# Patient Record
Sex: MALE | HISPANIC OR LATINO | Employment: UNEMPLOYED | URBAN - METROPOLITAN AREA
[De-identification: names, ages, dates, MRNs, and addresses within clinical notes are randomized per-mention and may not be internally consistent; named-entity substitution may affect disease eponyms.]

---

## 2020-01-15 ENCOUNTER — TRANSFERRED RECORDS (OUTPATIENT)
Dept: HEALTH INFORMATION MANAGEMENT | Facility: CLINIC | Age: 20
End: 2020-01-15

## 2020-01-15 LAB — EJECTION FRACTION: 56 %

## 2020-09-22 ENCOUNTER — TRANSFERRED RECORDS (OUTPATIENT)
Dept: HEALTH INFORMATION MANAGEMENT | Facility: CLINIC | Age: 20
End: 2020-09-22

## 2022-09-26 ENCOUNTER — TRANSFERRED RECORDS (OUTPATIENT)
Dept: HEALTH INFORMATION MANAGEMENT | Facility: CLINIC | Age: 22
End: 2022-09-26

## 2022-09-26 LAB — EJECTION FRACTION: 77 %

## 2022-09-29 ENCOUNTER — TRANSCRIBE ORDERS (OUTPATIENT)
Dept: OTHER | Age: 22
End: 2022-09-29

## 2022-09-29 DIAGNOSIS — Q24.4 SUBAORTIC VALVE STENOSIS, CONGENITAL: Primary | ICD-10-CM

## 2022-09-29 DIAGNOSIS — Q23.1 CONGENITAL INSUFFICIENCY OF AORTIC VALVE: ICD-10-CM

## 2022-11-30 NOTE — TELEPHONE ENCOUNTER
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  Primary closure   of small atrial septal defect.  Both done on cardiopulmonary bypass.     LABS   Internal    SCANS     EKG/MONITORS   Care Everywhere 1.6.21 ekg, [facility]   IMAGES      CT/CTA (head, neck, chest, abdomen, pelvis) PACS 3.24.18 CT ABDOMEN PELVIS, [facility]   XRAY (chest) PACS 1.6.21 [facility]     Action 11.30.22 10:41 AM CHARAN   Action Taken Faxed request to [facility] for records [phone]. Faxed request to [facility] [phone], [phone]  for images      Action 12.27.22 8:11 AM CHARAN    Action Taken Faxed another request to [facility]

## 2023-01-10 ENCOUNTER — LAB (OUTPATIENT)
Dept: LAB | Facility: CLINIC | Age: 23
End: 2023-01-10
Payer: COMMERCIAL

## 2023-01-10 ENCOUNTER — PRE VISIT (OUTPATIENT)
Dept: CARDIOLOGY | Facility: CLINIC | Age: 23
End: 2023-01-10

## 2023-01-10 ENCOUNTER — ANCILLARY PROCEDURE (OUTPATIENT)
Dept: CARDIOLOGY | Facility: CLINIC | Age: 23
End: 2023-01-10
Payer: COMMERCIAL

## 2023-01-10 ENCOUNTER — OFFICE VISIT (OUTPATIENT)
Dept: CARDIOLOGY | Facility: CLINIC | Age: 23
End: 2023-01-10
Payer: COMMERCIAL

## 2023-01-10 VITALS
OXYGEN SATURATION: 98 % | BODY MASS INDEX: 27.01 KG/M2 | SYSTOLIC BLOOD PRESSURE: 124 MMHG | HEIGHT: 64 IN | WEIGHT: 158.2 LBS | DIASTOLIC BLOOD PRESSURE: 82 MMHG | HEART RATE: 69 BPM

## 2023-01-10 DIAGNOSIS — Q24.4 SUBAORTIC VALVE STENOSIS, CONGENITAL: ICD-10-CM

## 2023-01-10 LAB
ALBUMIN SERPL BCG-MCNC: 4.6 G/DL (ref 3.5–5.2)
ALP SERPL-CCNC: 90 U/L (ref 40–129)
ALT SERPL W P-5'-P-CCNC: 20 U/L (ref 10–50)
ANION GAP SERPL CALCULATED.3IONS-SCNC: 8 MMOL/L (ref 7–15)
AST SERPL W P-5'-P-CCNC: 22 U/L (ref 10–50)
BASOPHILS # BLD AUTO: 0.1 10E3/UL (ref 0–0.2)
BASOPHILS NFR BLD AUTO: 1 %
BILIRUB SERPL-MCNC: 0.3 MG/DL
BUN SERPL-MCNC: 8.4 MG/DL (ref 6–20)
CALCIUM SERPL-MCNC: 9.8 MG/DL (ref 8.6–10)
CHLORIDE SERPL-SCNC: 101 MMOL/L (ref 98–107)
CHOLEST SERPL-MCNC: 127 MG/DL
CREAT SERPL-MCNC: 0.93 MG/DL (ref 0.67–1.17)
DEPRECATED HCO3 PLAS-SCNC: 30 MMOL/L (ref 22–29)
EOSINOPHIL # BLD AUTO: 0.1 10E3/UL (ref 0–0.7)
EOSINOPHIL NFR BLD AUTO: 2 %
ERYTHROCYTE [DISTWIDTH] IN BLOOD BY AUTOMATED COUNT: 12.4 % (ref 10–15)
GFR SERPL CREATININE-BSD FRML MDRD: >90 ML/MIN/1.73M2
GLUCOSE SERPL-MCNC: 103 MG/DL (ref 70–99)
HCT VFR BLD AUTO: 44.6 % (ref 40–53)
HDLC SERPL-MCNC: 33 MG/DL
HGB BLD-MCNC: 14.4 G/DL (ref 13.3–17.7)
IMM GRANULOCYTES # BLD: 0 10E3/UL
IMM GRANULOCYTES NFR BLD: 0 %
LDLC SERPL CALC-MCNC: 58 MG/DL
LYMPHOCYTES # BLD AUTO: 2.7 10E3/UL (ref 0.8–5.3)
LYMPHOCYTES NFR BLD AUTO: 32 %
MCH RBC QN AUTO: 25.6 PG (ref 26.5–33)
MCHC RBC AUTO-ENTMCNC: 32.3 G/DL (ref 31.5–36.5)
MCV RBC AUTO: 79 FL (ref 78–100)
MONOCYTES # BLD AUTO: 0.8 10E3/UL (ref 0–1.3)
MONOCYTES NFR BLD AUTO: 9 %
NEUTROPHILS # BLD AUTO: 5 10E3/UL (ref 1.6–8.3)
NEUTROPHILS NFR BLD AUTO: 56 %
NONHDLC SERPL-MCNC: 94 MG/DL
NRBC # BLD AUTO: 0 10E3/UL
NRBC BLD AUTO-RTO: 0 /100
PLATELET # BLD AUTO: 341 10E3/UL (ref 150–450)
POTASSIUM SERPL-SCNC: 3.6 MMOL/L (ref 3.4–5.3)
PROT SERPL-MCNC: 7.6 G/DL (ref 6.4–8.3)
RBC # BLD AUTO: 5.63 10E6/UL (ref 4.4–5.9)
SODIUM SERPL-SCNC: 139 MMOL/L (ref 136–145)
TRIGL SERPL-MCNC: 181 MG/DL
TSH SERPL DL<=0.005 MIU/L-ACNC: 1.36 UIU/ML (ref 0.3–4.2)
WBC # BLD AUTO: 8.7 10E3/UL (ref 4–11)

## 2023-01-10 PROCEDURE — 93325 DOPPLER ECHO COLOR FLOW MAPG: CPT | Performed by: PEDIATRICS

## 2023-01-10 PROCEDURE — 80050 GENERAL HEALTH PANEL: CPT | Performed by: PATHOLOGY

## 2023-01-10 PROCEDURE — 93005 ELECTROCARDIOGRAM TRACING: CPT

## 2023-01-10 PROCEDURE — 93320 DOPPLER ECHO COMPLETE: CPT | Performed by: PEDIATRICS

## 2023-01-10 PROCEDURE — 36415 COLL VENOUS BLD VENIPUNCTURE: CPT | Performed by: PATHOLOGY

## 2023-01-10 PROCEDURE — G0463 HOSPITAL OUTPT CLINIC VISIT: HCPCS | Mod: 25

## 2023-01-10 PROCEDURE — 80061 LIPID PANEL: CPT | Performed by: PATHOLOGY

## 2023-01-10 PROCEDURE — 93303 ECHO TRANSTHORACIC: CPT | Performed by: PEDIATRICS

## 2023-01-10 PROCEDURE — 99204 OFFICE O/P NEW MOD 45 MIN: CPT | Mod: 25

## 2023-01-10 RX ORDER — ALBUTEROL SULFATE 90 UG/1
AEROSOL, METERED RESPIRATORY (INHALATION)
COMMUNITY
Start: 2022-11-30 | End: 2023-01-10

## 2023-01-10 ASSESSMENT — PAIN SCALES - GENERAL: PAINLEVEL: MILD PAIN (3)

## 2023-01-10 NOTE — PROGRESS NOTES
Adult Congenital Cardiology New Patient Visit    Patient:  Diogenes Streeter MRN:  [id number]   YOB: 2000 Age:  22 year old   Date of Visit:  Andrey 10, 2023 PCP:  Christianne Mosher MD     Dear Dr. Mosher,      I had the pleasure of meeting your patient Diogenes Streeter at the [facility] on Andrey 10, 2023.  Diogenes is a 21 yo male with history of subaortic membrane resection in 2006 at [facility]. He has had intermittent follow up at [facility] and [facility], most recently in September 2022. Diogenes is here today to establish care in our [facility] clinic.     Diogenes reports that he4 bothered by intermittent shortness of breath and chest tightness, more often in the winter. He more rarely has sharp chest pains. He does not feel they respond to his inhalers. It occurs about 3 times per week and lasts 30-60 minutes at a time. Can be improved with positional changes and vapor rub. He is active  At work with lifting and walking. Denies palpitations, edema, orthopnea. Does not do much regular exercise except for work.     Diogenes does not smoke or vape. He does have mild asthma. No regular medications.     Past medical history:   Cardiac: Subaortic stenosis s/p resection [facility] 2006 at 5 years of age (Dr. Alvarez)  Chronic chest pain > 6 months, atypical (per chart notes has had chronic chest pain for > 10 years)    Other PMH: Sleep apnea s/p Tonsillectomy at age 3 (Dr Townsend 2003)  Appendectomy 2015  Prepatellar nodule L knee, post traumatic  Mild asthma  Anxiety       Current Outpatient Medications   Medication Sig Dispense Refill     FLUoxetine (PROZAC) 20 MG capsule Take 1 capsule by mouth daily       Multiple Vitamins-Minerals (MULTIVITAMIN PO) Take 1 tablet by mouth daily         No Known Allergies      Family History:   Son age 7 years, healthy  Grandfather with heart problems by age 50  2 siblings healthy, mom healthy.  "  No FH of CHD, sudden death, myopathy, arrhythmia.         Social history:  Works, Lives with girlfriend and son, no smoking, rare EtoH  Social History     Occupational History     Not on file   Tobacco Use     Smoking status: Never     Smokeless tobacco: Never   Substance and Sexual Activity     Alcohol use: Not on file     Drug use: Not on file     Sexual activity: Not on file       Marital Status: Single      Review of Systems: A comprehensive review of systems was performed and is negative, except as noted in the HPI and PMH  Review of Systems     Physical exam:  Vitals: /82 (BP Location: Right arm, Patient Position: Chair, Cuff Size: Adult Regular)   Pulse 69   Ht 1.63 m (5' 4.17\")   Wt 71.8 kg (158 lb 3.2 oz)   SpO2 98%   BMI 27.01 kg/m    BMI= Body mass index is 27.01 kg/m . Well appearing young man. There is no central or peripheral cyanosis. Pupils are reactive and sclera are not jaundiced. There is no conjunctival injection or discharge. EOMI. Mucous membranes are moist and pink.   Lungs are clear to ausculation bilaterally with no wheezes, rales or rhonchi. There is no increased work of breathing, retractions or nasal flaring. Precordium is quiet with a normally placed apical impulse. Well healed midline sternotomy.  On auscultation, heart sounds are regular with normal S1 and physiologically split S2. Soft SM LMSB, no DM, rubs or gallops.  Abdomen is soft and non-tender without masses or hepatomegaly. Femoral pulses are normal with no brachial femoral delay.Skin is without rashes, lesions, or significant bruising. Extremities are warm and well-perfused with no cyanosis, clubbing or edema. Peripheral pulses are normal and there is < 2 sec capillary refill. Patient is alert and oriented and moves all extremities equally with normal tone.         12 Lead EKG performed today shows NSR at 67 BPM with normal intervals and no chamber enlargement or hypertrophy.     An echocardiogram performed today " shows normal ventricular function, Mild AI (Stable as described) and no significant LVOT gradient.     Recent Results (from the past 4320 hour(s))   Echo Congenital Adult    Narrative    [id number]  [id number]  [id number]  [id number]^LUKE^RHONDA^NISHANT                                                               Study ID: [id number]                                                 [facility] [address]                                                [place] [address]                                                Phone: [phone]                                Pediatric Echocardiogram  ______________________________________________________________________________  Name: PHIL MCINTOSH  Study Date: 01/10/2023 02:37 PM                  Patient Location: [facility]  MRN: [id number]                                  Age: 22 yrs  : 2000                                  BP: 134/79 mmHg  Gender: Male                                     HR: 72  Patient Class: Outpatient                        Height: 157 cm  Ordering Provider: RHONDA BUTLER             Weight: 72 kg  Referring Provider: RHONDA BUTLER            BSA: 1.7 m2  Performed By: Milly Gudino  Report approved by: Daniel Kebede MD  Reason For Study: Subaortic valve stenosis, congenital  ______________________________________________________________________________  ##### CONCLUSIONS #####  Subaortic membrane status post resection ().     The subcostal views were difficult to obtain and are suboptimal in quality.  There is unobstructed flow through the left ventricular outflow tract, peak  gradient 11 mmHg. Mild aortic valve insufficiency, no aortic valve stenosis.  The left and right ventricles have normal chamber size, wall thickness, and  systolic  function.  ______________________________________________________________________________  Technical information:  A complete two dimensional, MMODE, spectral and color Doppler transthoracic  echocardiogram is performed. The study quality is fair. Images are obtained  from parasternal, apical, subcostal and suprasternal notch views. The  subcostal views were difficult to obtain and are suboptimal in quality. Prior  echocardiogram available for comparison. ECG tracing shows regular rhythm.     Segmental Anatomy:  There is normal atrial arrangement, with concordant atrioventricular and  ventriculoarterial connections.     Systemic and pulmonary veins:  The inferior vena cava drains normally to the right atrium. Color flow  demonstrates flow from at least one pulmonary vein entering the left atrium.     Atria and atrial septum:  Normal right atrial size. The left atrium is normal in size. The atrial septum  is not well visualized.     Atrioventricular valves:  The tricuspid valve is normal in appearance and motion. Trivial tricuspid  valve insufficiency. Estimated right ventricular systolic pressure is 27 mmHg  plus right atrial pressure. The mitral valve is normal in appearance and  motion. There is no mitral valve insufficiency.     Ventricles and Ventricular Septum:  The left and right ventricles have normal chamber size, wall thickness, and  systolic function. There is no ventricular level shunting.     Outflow tracts:  Normal great artery relationship. There is unobstructed flow through the right  ventricular outflow tract. The pulmonary valve motion is normal. There is  normal flow across the pulmonary valve. Trivial pulmonary valve insufficiency.  Post surgical resection of a subaortic membrane/ridge. There is unobstructed  flow through the left ventricular outflow tract. Tricuspid aortic valve with  normal appearance and motion. There is normal flow across the aortic valve.  Mild (1+) aortic valve  insufficiency.     Great arteries:  The main pulmonary artery has normal appearance. There is unobstructed flow in  the main pulmonary artery. The pulmonary artery bifurcation is normal. There  is unobstructed flow in both branch pulmonary arteries. Normal ascending  aorta. The aortic arch appears normal. There is unobstructed antegrade flow in  the ascending, transverse arch, descending thoracic and abdominal aorta.     Arterial Shunts:  The ductal region is not imaged with this study.     Coronaries:  The coronary arteries are not evaluated.     Effusions, catheters, cannulas and leads:  No pericardial effusion.     MMode/2D Measurements & Calculations  LA dimension: 3.6 cm                Ao root diam: 2.3 cm  LA/Ao: 1.6                          LVMI(BSA): 75.2 grams/m2  LVMI(Height): 39.6                  RWT(MM): 0.36     Doppler Measurements & Calculations  MV E max cheryl: 94.9 cm/sec              Ao V2 max: 163.9 cm/sec  MV A max cheryl: 63.0 cm/sec              Ao max PG: 10.8 mmHg  MV E/A: 1.5  LV V1 max: 162.8 cm/sec                PA V2 max: 104.5 cm/sec  LV V1 max PG: 10.7 mmHg                PA max P.4 mmHg  TR max cheryl: 259.3 cm/sec               LPA max cheryl: 99.2 cm/sec  TR max P.9 mmHg                   LPA max PG: 3.9 mmHg                                         RPA max cheryl: 106.5 cm/sec                                         RPA max P.5 mmHg     asc Ao max cheryl: 173.2 cm/sec          desc Ao max cheryl: 161.7 cm/sec  asc Ao max P.0 mmHg              desc Ao max PG: 10.5 mmHg  asc Ao mean P.3 mmHg  MPA max cheryl: 107.1 cm/sec  MPA max P.6 mmHg     [place] Z-Scores (Measurements & Calculations)  Measurement NameValue      Z-ScorePredictedNormal Range  IVSd(MM)        0.95 cm    -0.06  0.96     0.67 - 1.25  LVIDd(MM)       4.6 cm     -1.2   5.0      4.3 - 5.7  LVIDs(MM)       2.8 cm     -1.3   3.2      2.5 - 3.9  LVPWd(MM)       0.83 cm    -0.59  0.90     0.66 - 1.14  LV mass(C)d(MM)  134.9 grams-0.98  164.0    111.1 - 241.9  FS(MM)          39.6 %     1.2    34.8     28.3 - 42.7     Report approved by: He Chi 01/10/2023 04:03 PM             NON- fasting Labs  Results for orders placed or performed in visit on 01/10/23   Comprehensive metabolic panel     Status: Abnormal   Result Value Ref Range    Sodium 139 136 - 145 mmol/L    Potassium 3.6 3.4 - 5.3 mmol/L    Chloride 101 98 - 107 mmol/L    Carbon Dioxide (CO2) 30 (H) 22 - 29 mmol/L    Anion Gap 8 7 - 15 mmol/L    Urea Nitrogen 8.4 6.0 - 20.0 mg/dL    Creatinine 0.93 0.67 - 1.17 mg/dL    Calcium 9.8 8.6 - 10.0 mg/dL    Glucose 103 (H) 70 - 99 mg/dL    Alkaline Phosphatase 90 40 - 129 U/L    AST 22 10 - 50 U/L    ALT 20 10 - 50 U/L    Protein Total 7.6 6.4 - 8.3 g/dL    Albumin 4.6 3.5 - 5.2 g/dL    Bilirubin Total 0.3 <=1.2 mg/dL    GFR Estimate >90 >60 mL/min/1.73m2   TSH with free T4 reflex     Status: Normal   Result Value Ref Range    TSH 1.36 0.30 - 4.20 uIU/mL   Lipid panel reflex to direct LDL Fasting     Status: Abnormal   Result Value Ref Range    Cholesterol 127 <200 mg/dL    Triglycerides 181 (H) <150 mg/dL    Direct Measure HDL 33 (L) >=40 mg/dL    LDL Cholesterol Calculated 58 <=100 mg/dL    Non HDL Cholesterol 94 <130 mg/dL    Narrative    Cholesterol  Desirable:  <200 mg/dL    Triglycerides  Normal:  Less than 150 mg/dL  Borderline High:  150-199 mg/dL  High:  200-499 mg/dL  Very High:  Greater than or equal to 500 mg/dL    Direct Measure HDL  Female:  Greater than or equal to 50 mg/dL   Male:  Greater than or equal to 40 mg/dL    LDL Cholesterol  Desirable:  <100mg/dL  Above Desirable:  100-129 mg/dL   Borderline High:  130-159 mg/dL   High:  160-189 mg/dL   Very High:  >= 190 mg/dL    Non HDL Cholesterol  Desirable:  130 mg/dL  Above Desirable:  130-159 mg/dL  Borderline High:  160-189 mg/dL  High:  190-219 mg/dL  Very High:  Greater than or equal to 220 mg/dL   CBC with platelets and differential     Status:  Abnormal   Result Value Ref Range    WBC Count 8.7 4.0 - 11.0 10e3/uL    RBC Count 5.63 4.40 - 5.90 10e6/uL    Hemoglobin 14.4 13.3 - 17.7 g/dL    Hematocrit 44.6 40.0 - 53.0 %    MCV 79 78 - 100 fL    MCH 25.6 (L) 26.5 - 33.0 pg    MCHC 32.3 31.5 - 36.5 g/dL    RDW 12.4 10.0 - 15.0 %    Platelet Count 341 150 - 450 10e3/uL    % Neutrophils 56 %    % Lymphocytes 32 %    % Monocytes 9 %    % Eosinophils 2 %    % Basophils 1 %    % Immature Granulocytes 0 %    NRBCs per 100 WBC 0 <1 /100    Absolute Neutrophils 5.0 1.6 - 8.3 10e3/uL    Absolute Lymphocytes 2.7 0.8 - 5.3 10e3/uL    Absolute Monocytes 0.8 0.0 - 1.3 10e3/uL    Absolute Eosinophils 0.1 0.0 - 0.7 10e3/uL    Absolute Basophils 0.1 0.0 - 0.2 10e3/uL    Absolute Immature Granulocytes 0.0 <=0.4 10e3/uL    Absolute NRBCs 0.0 10e3/uL   CBC with platelets differential     Status: Abnormal    Narrative    The following orders were created for panel order CBC with platelets differential.  Procedure                               Abnormality         Status                     ---------                               -----------         ------                     CBC with platelets and d...[[id number]]  Abnormal            Final result                 Please view results for these tests on the individual orders.         In summary, Diogenes is a 22 year old with h/o subaortic stenosis s/p resection at age 5 years. He has stable mild AI and normal ventricular function with no residual LVOT obstruction. He does have chronic chest pain, atypical for cardiac pain and much more likely due to asthma or chest wall pain. He has significant anxiety about this pain.     Recommendations:  Baseline post op CPX and one week zio patch Holter  Get regular exercise, no restrictions, rest if symptomatic   Use inhaler for shortness of breath   Regular dental visits, no antibiotic prophylaxis required  If no concerns with baseline testing, follow up one year with echocardiogram.   Add iron  panel, HgB A1C and Vit D levels to next labs.     Do recommend fetal echocardiogram with any future pregnancies.    Thank you for the opportunity to evaluate alessia. He should call if any new symptoms or concerns prior to his next visit.       Sincerely,    Travis Packer M.D.   of Pediatrics  Pediatric and Adult Congenital Cardiology  [facility]  Pediatric Cardiology Office [phone]  Adult Congenital Cardiology Triage and Scheduling [phone]    A total of 45 minutes were spent in personal review of testing, including echo, ECG and labs, review of documented history and interval events in chart,  face to face visit with discussion of findings and plan, and care coordination.       CC:Diogenes Roduez

## 2023-01-10 NOTE — LETTER
1/10/2023      RE: Diogenes Streeter  [address]  [facility] [address]       Dear Colleague,    Thank you for the opportunity to participate in the care of your patient, Diogenes Streeter, at the [facility] at [facility]. Please see a copy of my visit note below.    Adult Congenital Cardiology New Patient Visit    Patient:  Diogenes Streeter MRN:  [id number]   YOB: 2000 Age:  22 year old   Date of Visit:  Andrey 10, 2023 PCP:  Christianne Mosher MD     Dear Dr. Mosher,      I had the pleasure of meeting your patient Diogenes Streeter at the [facility] Adult Congenital [facility]iology [facility] on Andrey 10, 2023.  Diogenes is a 23 yo male with history of subaortic membrane resection in 2006 at [facility]. He has had intermittent follow up at [facility] and [facility], most recently in September 2022. Diogenes is here today to establish care in our [facility] clinic.     Diogenes reports that he4 bothered by intermittent shortness of breath and chest tightness, more often in the winter. He more rarely has sharp chest pains. He does not feel they respond to his inhalers. It occurs about 3 times per week and lasts 30-60 minutes at a time. Can be improved with positional changes and vapor rub. He is active  At work with lifting and walking. Denies palpitations, edema, orthopnea. Does not do much regular exercise except for work.     Diogenes does not smoke or vape. He does have mild asthma. No regular medications.     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 Alvarez)  Chronic chest pain > 6 months, atypical (per chart notes has had chronic chest pain for > 10 years)    Other PMH: Sleep apnea s/p Tonsillectomy at age 3 (Dr Townsend 2003)  Appendectomy 2015  Prepatellar nodule L knee, post traumatic  Mild asthma  Anxiety    "    Current Outpatient Medications   Medication Sig Dispense Refill     FLUoxetine (PROZAC) 20 MG capsule Take 1 capsule by mouth daily       Multiple Vitamins-Minerals (MULTIVITAMIN PO) Take 1 tablet by mouth daily         No Known Allergies      Family History:   Son age 7 years, healthy  Grandfather with heart problems by age 50  2 siblings healthy, mom healthy.   No FH of CHD, sudden death, myopathy, arrhythmia.         Social history:  Works, Lives with girlfriend and son, no smoking, rare EtoH  Social History     Occupational History     Not on file   Tobacco Use     Smoking status: Never     Smokeless tobacco: Never   Substance and Sexual Activity     Alcohol use: Not on file     Drug use: Not on file     Sexual activity: Not on file       Marital Status: Single      Review of Systems: A comprehensive review of systems was performed and is negative, except as noted in the HPI and PMH  Review of Systems     Physical exam:  Vitals: /82 (BP Location: Right arm, Patient Position: Chair, Cuff Size: Adult Regular)   Pulse 69   Ht 1.63 m (5' 4.17\")   Wt 71.8 kg (158 lb 3.2 oz)   SpO2 98%   BMI 27.01 kg/m    BMI= Body mass index is 27.01 kg/m . Well appearing young man. There is no central or peripheral cyanosis. Pupils are reactive and sclera are not jaundiced. There is no conjunctival injection or discharge. EOMI. Mucous membranes are moist and pink.   Lungs are clear to ausculation bilaterally with no wheezes, rales or rhonchi. There is no increased work of breathing, retractions or nasal flaring. Precordium is quiet with a normally placed apical impulse. Well healed midline sternotomy.  On auscultation, heart sounds are regular with normal S1 and physiologically split S2. Soft SM LMSB, no DM, rubs or gallops.  Abdomen is soft and non-tender without masses or hepatomegaly. Femoral pulses are normal with no brachial femoral delay.Skin is without rashes, lesions, or significant bruising. Extremities are " warm and well-perfused with no cyanosis, clubbing or edema. Peripheral pulses are normal and there is < 2 sec capillary refill. Patient is alert and oriented and moves all extremities equally with normal tone.         12 Lead EKG performed today shows NSR at 67 BPM with normal intervals and no chamber enlargement or hypertrophy.     An echocardiogram performed today shows normal ventricular function, Mild AI (Stable as described) and no significant LVOT gradient.     Recent Results (from the past 4320 hour(s))   Echo Congenital Adult    Narrative    [id number]  [id number]  [id number]  [id number]^LUKE^RHONDA^NISHANT                                                               Study ID: [id number]                                                 [facility]'s [address]                                                [place] [address]                                                Phone: [phone]                                Pediatric Echocardiogram  ______________________________________________________________________________  Name: PHIL MCINTOSH  Study Date: 01/10/2023 02:37 PM                  Patient Location: [facility]  MRN: [id number]                                  Age: 22 yrs  : 2000                                  BP: 134/79 mmHg  Gender: Male                                     HR: 72  Patient Class: Outpatient                        Height: 157 cm  Ordering Provider: RHONDA BUTLER             Weight: 72 kg  Referring Provider: RHONDA BUTLER            BSA: 1.7 m2  Performed By: Milly Gudino  Report approved by: Daniel Kebede MD  Reason For Study: Subaortic valve stenosis, congenital  ______________________________________________________________________________  ##### CONCLUSIONS #####  Subaortic membrane status post resection ().     The  subcostal views were difficult to obtain and are suboptimal in quality.  There is unobstructed flow through the left ventricular outflow tract, peak  gradient 11 mmHg. Mild aortic valve insufficiency, no aortic valve stenosis.  The left and right ventricles have normal chamber size, wall thickness, and  systolic function.  ______________________________________________________________________________  Technical information:  A complete two dimensional, MMODE, spectral and color Doppler transthoracic  echocardiogram is performed. The study quality is fair. Images are obtained  from parasternal, apical, subcostal and suprasternal notch views. The  subcostal views were difficult to obtain and are suboptimal in quality. Prior  echocardiogram available for comparison. ECG tracing shows regular rhythm.     Segmental Anatomy:  There is normal atrial arrangement, with concordant atrioventricular and  ventriculoarterial connections.     Systemic and pulmonary veins:  The inferior vena cava drains normally to the right atrium. Color flow  demonstrates flow from at least one pulmonary vein entering the left atrium.     Atria and atrial septum:  Normal right atrial size. The left atrium is normal in size. The atrial septum  is not well visualized.     Atrioventricular valves:  The tricuspid valve is normal in appearance and motion. Trivial tricuspid  valve insufficiency. Estimated right ventricular systolic pressure is 27 mmHg  plus right atrial pressure. The mitral valve is normal in appearance and  motion. There is no mitral valve insufficiency.     Ventricles and Ventricular Septum:  The left and right ventricles have normal chamber size, wall thickness, and  systolic function. There is no ventricular level shunting.     Outflow tracts:  Normal great artery relationship. There is unobstructed flow through the right  ventricular outflow tract. The pulmonary valve motion is normal. There is  normal flow across the pulmonary  valve. Trivial pulmonary valve insufficiency.  Post surgical resection of a subaortic membrane/ridge. There is unobstructed  flow through the left ventricular outflow tract. Tricuspid aortic valve with  normal appearance and motion. There is normal flow across the aortic valve.  Mild (1+) aortic valve insufficiency.     Great arteries:  The main pulmonary artery has normal appearance. There is unobstructed flow in  the main pulmonary artery. The pulmonary artery bifurcation is normal. There  is unobstructed flow in both branch pulmonary arteries. Normal ascending  aorta. The aortic arch appears normal. There is unobstructed antegrade flow in  the ascending, transverse arch, descending thoracic and abdominal aorta.     Arterial Shunts:  The ductal region is not imaged with this study.     Coronaries:  The coronary arteries are not evaluated.     Effusions, catheters, cannulas and leads:  No pericardial effusion.     MMode/2D Measurements & Calculations  LA dimension: 3.6 cm                Ao root diam: 2.3 cm  LA/Ao: 1.6                          LVMI(BSA): 75.2 grams/m2  LVMI(Height): 39.6                  RWT(MM): 0.36     Doppler Measurements & Calculations  MV E max cheryl: 94.9 cm/sec              Ao V2 max: 163.9 cm/sec  MV A max cheryl: 63.0 cm/sec              Ao max PG: 10.8 mmHg  MV E/A: 1.5  LV V1 max: 162.8 cm/sec                PA V2 max: 104.5 cm/sec  LV V1 max PG: 10.7 mmHg                PA max P.4 mmHg  TR max cheryl: 259.3 cm/sec               LPA max cheryl: 99.2 cm/sec  TR max P.9 mmHg                   LPA max PG: 3.9 mmHg                                         RPA max cheryl: 106.5 cm/sec                                         RPA max P.5 mmHg     asc Ao max cheryl: 173.2 cm/sec          desc Ao max cheryl: 161.7 cm/sec  asc Ao max P.0 mmHg              desc Ao max PG: 10.5 mmHg  asc Ao mean P.3 mmHg  MPA max cheryl: 107.1 cm/sec  MPA max P.6 mmHg     Oral Z-Scores (Measurements &  Calculations)  Measurement NameValue      Z-ScorePredictedNormal Range  IVSd(MM)        0.95 cm    -0.06  0.96     0.67 - 1.25  LVIDd(MM)       4.6 cm     -1.2   5.0      4.3 - 5.7  LVIDs(MM)       2.8 cm     -1.3   3.2      2.5 - 3.9  LVPWd(MM)       0.83 cm    -0.59  0.90     0.66 - 1.14  LV mass(C)d(MM) 134.9 grams-0.98  164.0    111.1 - 241.9  FS(MM)          39.6 %     1.2    34.8     28.3 - 42.7     Report approved by: He Chi 01/10/2023 04:03 PM             NON- fasting Labs  Results for orders placed or performed in visit on 01/10/23   Comprehensive metabolic panel     Status: Abnormal   Result Value Ref Range    Sodium 139 136 - 145 mmol/L    Potassium 3.6 3.4 - 5.3 mmol/L    Chloride 101 98 - 107 mmol/L    Carbon Dioxide (CO2) 30 (H) 22 - 29 mmol/L    Anion Gap 8 7 - 15 mmol/L    Urea Nitrogen 8.4 6.0 - 20.0 mg/dL    Creatinine 0.93 0.67 - 1.17 mg/dL    Calcium 9.8 8.6 - 10.0 mg/dL    Glucose 103 (H) 70 - 99 mg/dL    Alkaline Phosphatase 90 40 - 129 U/L    AST 22 10 - 50 U/L    ALT 20 10 - 50 U/L    Protein Total 7.6 6.4 - 8.3 g/dL    Albumin 4.6 3.5 - 5.2 g/dL    Bilirubin Total 0.3 <=1.2 mg/dL    GFR Estimate >90 >60 mL/min/1.73m2   TSH with free T4 reflex     Status: Normal   Result Value Ref Range    TSH 1.36 0.30 - 4.20 uIU/mL   Lipid panel reflex to direct LDL Fasting     Status: Abnormal   Result Value Ref Range    Cholesterol 127 <200 mg/dL    Triglycerides 181 (H) <150 mg/dL    Direct Measure HDL 33 (L) >=40 mg/dL    LDL Cholesterol Calculated 58 <=100 mg/dL    Non HDL Cholesterol 94 <130 mg/dL    Narrative    Cholesterol  Desirable:  <200 mg/dL    Triglycerides  Normal:  Less than 150 mg/dL  Borderline High:  150-199 mg/dL  High:  200-499 mg/dL  Very High:  Greater than or equal to 500 mg/dL    Direct Measure HDL  Female:  Greater than or equal to 50 mg/dL   Male:  Greater than or equal to 40 mg/dL    LDL Cholesterol  Desirable:  <100mg/dL  Above Desirable:  100-129 mg/dL   Borderline  High:  130-159 mg/dL   High:  160-189 mg/dL   Very High:  >= 190 mg/dL    Non HDL Cholesterol  Desirable:  130 mg/dL  Above Desirable:  130-159 mg/dL  Borderline High:  160-189 mg/dL  High:  190-219 mg/dL  Very High:  Greater than or equal to 220 mg/dL   CBC with platelets and differential     Status: Abnormal   Result Value Ref Range    WBC Count 8.7 4.0 - 11.0 10e3/uL    RBC Count 5.63 4.40 - 5.90 10e6/uL    Hemoglobin 14.4 13.3 - 17.7 g/dL    Hematocrit 44.6 40.0 - 53.0 %    MCV 79 78 - 100 fL    MCH 25.6 (L) 26.5 - 33.0 pg    MCHC 32.3 31.5 - 36.5 g/dL    RDW 12.4 10.0 - 15.0 %    Platelet Count 341 150 - 450 10e3/uL    % Neutrophils 56 %    % Lymphocytes 32 %    % Monocytes 9 %    % Eosinophils 2 %    % Basophils 1 %    % Immature Granulocytes 0 %    NRBCs per 100 WBC 0 <1 /100    Absolute Neutrophils 5.0 1.6 - 8.3 10e3/uL    Absolute Lymphocytes 2.7 0.8 - 5.3 10e3/uL    Absolute Monocytes 0.8 0.0 - 1.3 10e3/uL    Absolute Eosinophils 0.1 0.0 - 0.7 10e3/uL    Absolute Basophils 0.1 0.0 - 0.2 10e3/uL    Absolute Immature Granulocytes 0.0 <=0.4 10e3/uL    Absolute NRBCs 0.0 10e3/uL   CBC with platelets differential     Status: Abnormal    Narrative    The following orders were created for panel order CBC with platelets differential.  Procedure                               Abnormality         Status                     ---------                               -----------         ------                     CBC with platelets and d...[[id number]]  Abnormal            Final result                 Please view results for these tests on the individual orders.         In summary, Diogenes is a 22 year old with h/o subaortic stenosis s/p resection at age 5 years. He has stable mild AI and normal ventricular function with no residual LVOT obstruction. He does have chronic chest pain, atypical for cardiac pain and much more likely due to asthma or chest wall pain. He has significant anxiety about this pain.      Recommendations:  Baseline post op CPX and one week zio patch Holter  Get regular exercise, no restrictions, rest if symptomatic   Use inhaler for shortness of breath   Regular dental visits, no antibiotic prophylaxis required  If no concerns with baseline testing, follow up one year with echocardiogram.   Add iron panel, HgB A1C and Vit D levels to next labs.     Do recommend fetal echocardiogram with any future pregnancies.    Thank you for the opportunity to evaluate alessia. He should call if any new symptoms or concerns prior to his next visit.       Sincerely,    Travis Packer M.D.   of Pediatrics  Pediatric and Adult Congenital Cardiology  [facility]  Pediatric Cardiology Office [phone]  Adult Congenital Cardiology Triage and Scheduling [phone]    A total of 45 minutes were spent in personal review of testing, including echo, ECG and labs, review of documented history and interval events in chart,  face to face visit with discussion of findings and plan, and care coordination.       CC:Diogenes Sawyer

## 2023-01-10 NOTE — PATIENT INSTRUCTIONS
You were seen today in the Adult Congenital and Cardiovascular Genetics Clinic at the [facility].    Cardiology Providers you saw during your visit:  Travis Packer MD    Diagnosis: subaortic valve stenosis    Results:  Travis Packer MD reviewed the results of your EKG, echo and labs testing today in clinic.    Recommendations for you:    We will help schedule you for a CPX and a 1 week zio      General Cardiac Recommendations:  Continue to eat a heart healthy, low salt diet.  Continue to get 20-30 minutes of aerobic activity, 4-5 days per week.  Examples of aerobic activity include walking, running, swimming, cycling, etc.  Continue to observe good oral hygiene, with regular dental visits.      SBE prophylaxis:   Yes____  No__X__    If YES is checked, follow the recommendations outlined below:  Take antibiotic(s) prior to recommended dental procedures and procedures on the respiratory tract or with infected skin, muscle or bones. SBE prophylaxis is not needed for routine GI and  procedures (ie. Colonoscopy or vaginal delivery)  Observe good oral hygiene daily, as advised by your dentist. Get regular professional dental care.  Keep cuts clean.  Infections should be treated promptly.  Symptoms of Infective Endocarditis could include: fever lasting more than 4-5 days or a recurrent fever that initially resolves but returns within 1-2 days)      Exercise restrictions:   Yes__X__  No____         If yes, list restrictions:  Must be allowed to rest if fatigued or SOB      Work restrictions:  Yes____  No_X___         If yes, list restrictions:    FASTING CHOLESTEROL was checked in the last 5 years YES_X__  NO___ (2023)  If no, please follow up with your primary care physician. You should have a cholesterol screening every 5 years.      Follow-up: Follow up with Dr. Packer in 1 year with an echo prior.    If you have questions or concerns please contact us at:    Pattie King, MPH, RN, BSN   Cheryl Morelos  (Scheduling)  Nurse Care Coordinator     Clinic   Adult Congenital and CV Genetics   Adult Congenital and CV Genetic  [facility]  (P) [phone]     (P) [phone]  [email or web address]  (F) [phone]        For after hours urgent needs, call [phone] and ask to speak to the Adult Congenital Physician on call.  Mention Job Code 0401.    For emergencies call 911.    [facility]  [facility]   Clinics and Surgery Center  Mail Code [id number]  [address] [address]

## 2023-01-11 LAB
ATRIAL RATE - MUSE: 67 BPM
DIASTOLIC BLOOD PRESSURE - MUSE: NORMAL MMHG
INTERPRETATION ECG - MUSE: NORMAL
P AXIS - MUSE: 38 DEGREES
PR INTERVAL - MUSE: 140 MS
QRS DURATION - MUSE: 82 MS
QT - MUSE: 358 MS
QTC - MUSE: 378 MS
R AXIS - MUSE: -7 DEGREES
SYSTOLIC BLOOD PRESSURE - MUSE: NORMAL MMHG
T AXIS - MUSE: 44 DEGREES
VENTRICULAR RATE- MUSE: 67 BPM

## 2023-02-14 ENCOUNTER — ALLIED HEALTH/NURSE VISIT (OUTPATIENT)
Dept: CARDIOLOGY | Facility: CLINIC | Age: 23
End: 2023-02-14
Payer: COMMERCIAL

## 2023-02-14 DIAGNOSIS — Q24.4 SUBAORTIC VALVE STENOSIS, CONGENITAL: ICD-10-CM

## 2023-02-14 PROCEDURE — 93248 EXT ECG>7D<15D REV&INTERPJ: CPT | Performed by: INTERNAL MEDICINE

## 2023-04-05 ENCOUNTER — OFFICE VISIT (OUTPATIENT)
Dept: FAMILY MEDICINE | Facility: CLINIC | Age: 23
End: 2023-04-05
Payer: COMMERCIAL

## 2023-04-05 VITALS
WEIGHT: 142 LBS | RESPIRATION RATE: 18 BRPM | SYSTOLIC BLOOD PRESSURE: 121 MMHG | OXYGEN SATURATION: 98 % | TEMPERATURE: 98.1 F | HEART RATE: 83 BPM | DIASTOLIC BLOOD PRESSURE: 82 MMHG | BODY MASS INDEX: 24.24 KG/M2

## 2023-04-05 DIAGNOSIS — R10.13 EPIGASTRIC PAIN: ICD-10-CM

## 2023-04-05 DIAGNOSIS — R51.9 ACUTE NONINTRACTABLE HEADACHE, UNSPECIFIED HEADACHE TYPE: Primary | ICD-10-CM

## 2023-04-05 LAB
FLUAV AG SPEC QL IA: NEGATIVE
FLUBV AG SPEC QL IA: NEGATIVE

## 2023-04-05 PROCEDURE — U0003 INFECTIOUS AGENT DETECTION BY NUCLEIC ACID (DNA OR RNA); SEVERE ACUTE RESPIRATORY SYNDROME CORONAVIRUS 2 (SARS-COV-2) (CORONAVIRUS DISEASE [COVID-19]), AMPLIFIED PROBE TECHNIQUE, MAKING USE OF HIGH THROUGHPUT TECHNOLOGIES AS DESCRIBED BY CMS-2020-01-R: HCPCS | Performed by: STUDENT IN AN ORGANIZED HEALTH CARE EDUCATION/TRAINING PROGRAM

## 2023-04-05 PROCEDURE — 99203 OFFICE O/P NEW LOW 30 MIN: CPT | Mod: CS | Performed by: STUDENT IN AN ORGANIZED HEALTH CARE EDUCATION/TRAINING PROGRAM

## 2023-04-05 PROCEDURE — 87804 INFLUENZA ASSAY W/OPTIC: CPT | Performed by: STUDENT IN AN ORGANIZED HEALTH CARE EDUCATION/TRAINING PROGRAM

## 2023-04-05 PROCEDURE — U0005 INFEC AGEN DETEC AMPLI PROBE: HCPCS | Performed by: STUDENT IN AN ORGANIZED HEALTH CARE EDUCATION/TRAINING PROGRAM

## 2023-04-05 NOTE — PATIENT INSTRUCTIONS
For your headache, try Tylenol/ibuprofen, increasing fluid intake, rest.  Take acetaminophen and/or ibuprofen as needed for pain  Acetaminophen (tylenol)  325-650 mg every 4-6 hours as needed OR 1000 mg every 6 hours as needed. Maximum dose: 4000 mg/day  Ibuprofen (advil, motrin)  200-400 mg every 4-6 hours as needed -800 mg every 6-8 hours as needed. Maximum dose: 3200 mg/day     For abdominal pain/constipation, increase your fluid intake (at least 8-8 ounce glasses of water daily), high fiber diet. You may try MiraLax if still feeling constipated. You may also try restarting omeprazole for your reflux. Follow-up with GI as scheduled if still having symptoms.       Go to the ER if you develop severe headache, vision changes, difficulty with balance, severe abdominal pain, unable to keep fluids down.    Follow-up with your PCP as scheduled if symptoms persist.

## 2023-04-05 NOTE — PROGRESS NOTES
ASSESSMENT & PLAN:   Diagnoses and all orders for this visit:  Acute nonintractable headache, unspecified headache type  -     Symptomatic COVID-19 Virus (Coronavirus) by PCR Nose  -     Influenza A/B antigen  Epigastric pain    Headache that began yesterday -no red flag symptoms, no focal neurologic symptoms. Has not taken any OTC analgesics. Recommend starting with Tylenol/ibuprofen, rest, increase fluids. COVID and flu test pending.     Abdominal pain ongoing x2 weeks- no red flags. On exam there is mild epigastric tenderness. Recommend restarting omeprazole for reflux, increased fluids, high-fiber diet, MiraLAX as needed for constipation. He has appointment scheduled with GI -advise follow-up there if having continued symptoms.    Return in about 2 weeks (around 4/19/2023) for follow-up with PCP if symptoms persist.    Patient Instructions   For your headache, try Tylenol/ibuprofen, increasing fluid intake, rest.  Take acetaminophen and/or ibuprofen as needed for pain  Acetaminophen (tylenol)  325-650 mg every 4-6 hours as needed OR 1000 mg every 6 hours as needed. Maximum dose: 4000 mg/day  Ibuprofen (advil, motrin)  200-400 mg every 4-6 hours as needed -800 mg every 6-8 hours as needed. Maximum dose: 3200 mg/day     For abdominal pain/constipation, increase your fluid intake (at least 8-8 ounce glasses of water daily), high fiber diet. You may try MiraLax if still feeling constipated. You may also try restarting omeprazole for your reflux. Follow-up with GI as scheduled if still having symptoms.       Go to the ER if you develop severe headache, vision changes, difficulty with balance, severe abdominal pain, unable to keep fluids down.    Follow-up with your PCP as scheduled if symptoms persist.      At the end of the encounter, I discussed results, diagnosis, medications. Discussed red flags for immediate return to clinic/ER, as well as indications for follow up if no improvement. Patient and/or  "caregiver understood and agreed to plan. Patient was stable for discharge.    ------------------------------------------------------------------------  SUBJECTIVE  Patient presents with:  Headache: X 1 day, feels foggy and dizzy. Lightheaded sometimes.   Abdominal Pain: X 2wks, have trouble swallowing food, hurts to swallow. \"Lump feeling in throat.\" Has been forcing food down just so he can eat something. Reports constipation.     HPI  Diogenes Streeter is a(n) 23 year old male presenting to clinic today for headache that began yesterday. Headache with gradual onset, seems better today. He has had some increased photophobia with the bright light outside. Endorses brain fog. When he moves his head quickly he gets dizzy. Symptoms improved with laying down. He has had vertigo before and this feels similar to that. He has not taken any medication for this. No vision changes. No fever, cough, congestion, sore throat.    Also has lower abdominal pain x2 weeks. Describes pain as feeling bloated. Pain worse after eating. Pain is improved with nothing. Has tried Tums without relief. He feels constipated. Last bowel movement was earlier today. No nausea, vomiting, hematochezia, urinary symptoms. Endorses a feeling of something stuck in his throat x2 weeks. Reports a history of reflux. He was seen in urgent care 5 days ago -at that time had negative strep, H. pylori, stool testing.      Review of Systems    No current outpatient medications on file.     Problem List:  There are no relevant problems documented for this patient.    No Known Allergies      OBJECTIVE  Vitals:    04/05/23 1327   BP: 121/82   BP Location: Right arm   Patient Position: Sitting   Cuff Size: Adult Regular   Pulse: 83   Resp: 18   Temp: 98.1  F (36.7  C)   TempSrc: Oral   SpO2: 98%   Weight: 64.4 kg (142 lb)     Physical Exam   GENERAL: healthy, alert, no acute distress.   HEAD: normocephalic, atraumatic.  EAR: external ear normal. Bilateral ear " canals normal and nonpainful. Bilateral TM intact, pearly, translucent without bulging.  NOSE: external nose atraumatic without lesions.  OROPHARYNX: moist mucous membranes. Oropharynx without erythema or exudate. Uvula midline. Patent airway.  LUNGS: no increased work of breathing. Clear lung sounds bilaterally. No wheezing, rhonchi, or rales.   CV: regular rate and rhythm. No clicks, murmurs, or rubs.  ABDOMEN: soft, nondistended. Epigastric tenderness. No guarding or rebound tenderness. Negative Shrestha's. Negative McBurney. Negative Rovsing. Normoactive bowel sounds.  NEURO: PERRL. EOMs intact. No nystagmus. Cranial nerves intact and symmetric. Moves all extremities equally. Normal gait.     No results found for any visits on 04/05/23.

## 2023-04-06 LAB — SARS-COV-2 RNA RESP QL NAA+PROBE: NEGATIVE

## 2023-04-12 ENCOUNTER — HOSPITAL ENCOUNTER (OUTPATIENT)
Dept: CARDIOLOGY | Facility: CLINIC | Age: 23
Discharge: HOME | End: 2023-04-12
Payer: COMMERCIAL

## 2023-04-12 VITALS — WEIGHT: 142.42 LBS | BODY MASS INDEX: 24.31 KG/M2

## 2023-04-12 DIAGNOSIS — Q24.4 SUBAORTIC VALVE STENOSIS, CONGENITAL: ICD-10-CM

## 2023-04-12 PROCEDURE — 94621 CARDIOPULM EXERCISE TESTING: CPT

## 2023-04-12 PROCEDURE — 94621 CARDIOPULM EXERCISE TESTING: CPT | Mod: 26 | Performed by: INTERNAL MEDICINE

## 2023-04-13 LAB
CARDIOPULMONARY ANAEROBIC THRESHOLD PREDICTED PEAK: 85 %
CARDIOPULMONARY ANAEROBIC THRESHOLD VO2: 35.4 ML/KG/MIN
CARDIOPULMONARY BLOOD PRESSURE REST: NORMAL MMHG
CARDIOPULMONARY BREATHING RESERVE REST: 93.3
CARDIOPULMONARY BREATHING RESERVE V02MAX: 14
CARDIOPULMONARY CO2 OUTPUT REST: 180 ML/MIN
CARDIOPULMONARY CO2 OUTPUT VO2MAX: 3243 ML/MIN
CARDIOPULMONARY FEV 1.0 (L) ACTUAL: 3.11
CARDIOPULMONARY FEV 1.0 (L) PRECENT: 80 %
CARDIOPULMONARY FEV 1.0 (L) PREDICTED: 3.89
CARDIOPULMONARY FEV 1.0 FVC (%) ACTUAL: 80.4
CARDIOPULMONARY FEV 1.0 FVC (%) PERCENT: 97 %
CARDIOPULMONARY FEV 1.0 FVC (%) PREDICTED: 83.1
CARDIOPULMONARY FUNCTIONAL CAPACITY MAX ML/KG/MIN: 40.9 ML/KG/MIN
CARDIOPULMONARY FUNCTIONAL CAPACITY PERCENT: 98 %
CARDIOPULMONARY FUNCTIONAL CAPACITY PREDICTED: 41.8 ML/KG/MIN
CARDIOPULMONARY FVC (L) ACTUAL: 3.86
CARDIOPULMONARY FVC (L) PERCENT: 84 %
CARDIOPULMONARY FVC (L) PREDICTED: 4.62
CARDIOPULMONARY HEART RATE REST: 86 BPM
CARDIOPULMONARY MET'S REST: 1.1
CARDIOPULMONARY MINUTE VENTILATION REST: 7.2 L/MIN
CARDIOPULMONARY MINUTE VENTILATION VO2MAX: 93.8 L/MIN
CARDIOPULMONARY MYOCARDIAC O2 DEMAND MAX: NORMAL
CARDIOPULMONARY OXYGEN CONSUMPTION REST: 3.7 ML/KG/MIN
CARDIOPULMONARY OXYGEN CONSUMPTION VO2MAX: 40.9 ML/KG/MIN
CARDIOPULMONARY OXYGEN PULSE REST: 3 ML/BEAT
CARDIOPULMONARY OXYGEN PULSE VO2MAX: 12.6 ML/BEAT
CARDIOPULMONARY OXYGEN SATURATION- OXIMETRY REST: 100 %
CARDIOPULMONARY OXYGEN SATURATION- OXIMETRY VO2MAX: 98 %
CARDIOPULMONARY PET C02 REST: 38
CARDIOPULMONARY PET C02 VO2MAX: 39
CARDIOPULMONARY PET02 REST: 96
CARDIOPULMONARY PET02 V02 MAX: 109
CARDIOPULMONARY RER: 1.19
CARDIOPULMONARY RESPIRALORY EXCHANGE RATIO VO2MAX: 1.19
CARDIOPULMONARY RESPIRALORY EXCHANGE RATIO: 0.75
CARDIOPULMONARY RESPIRATORY RATE REST: 18 BR/MIN
CARDIOPULMONARY RESPIRATORY RATE VO2MAX: 46 BR/MIN
CARDIOPULMONARY STRESS BASE 1 BP MMHG: NORMAL MMHG
CARDIOPULMONARY STRESS BASE 1 BPA: 188 BPM
CARDIOPULMONARY STRESS BASE 1 SPO2: 98 % SPO2
CARDIOPULMONARY STRESS BASE 1 TIME SEC: 0 SEC
CARDIOPULMONARY STRESS BASE 1 TIME: 1 MINS
CARDIOPULMONARY STRESS BASE 2 BP MMHG: NORMAL MMHG
CARDIOPULMONARY STRESS BASE 2 BPA: 158 BPM
CARDIOPULMONARY STRESS BASE 2 SPO2: 97 % SPO2
CARDIOPULMONARY STRESS BASE 2 TIME SEC: 0 SEC
CARDIOPULMONARY STRESS BASE 2 TIME: 3 MINS
CARDIOPULMONARY STRESS BASE 3 BP MMHG: NORMAL MMHG
CARDIOPULMONARY STRESS BASE 3 BPA: 140 BPM
CARDIOPULMONARY STRESS BASE 3 SPO2: 97 % SPO2
CARDIOPULMONARY STRESS BASE 3 TIME SEC: 0 SEC
CARDIOPULMONARY STRESS BASE 3 TIME: 5 MINS
CARDIOPULMONARY STRESS PHASE 1 BP MMHG: NORMAL MMHG
CARDIOPULMONARY STRESS PHASE 1 BPM: 119 BPM
CARDIOPULMONARY STRESS PHASE 1 SPO2: 98 % SPO2
CARDIOPULMONARY STRESS PHASE 1 TIME SEC: 0 SEC
CARDIOPULMONARY STRESS PHASE 1 TIME: 3 MINS
CARDIOPULMONARY STRESS PHASE 2 BP MMHG: NORMAL MMHG
CARDIOPULMONARY STRESS PHASE 2 BPM: 149 BPM
CARDIOPULMONARY STRESS PHASE 2 SPO2: 100 % SPO2
CARDIOPULMONARY STRESS PHASE 2 TIME SEC: 0 SEC
CARDIOPULMONARY STRESS PHASE 2 TIME: 6 MINS
CARDIOPULMONARY STRESS PHASE 3 BP MMHG: NORMAL MMHG
CARDIOPULMONARY STRESS PHASE 3 BPM: 165 BPM
CARDIOPULMONARY STRESS PHASE 3 SPO2: 99 % SPO2
CARDIOPULMONARY STRESS PHASE 3 TIME SEC: 0 SEC
CARDIOPULMONARY STRESS PHASE 3 TIME: 9 MINS
CARDIOPULMONARY STRESS PHASE 4 BP MMHG: NORMAL MMHG
CARDIOPULMONARY STRESS PHASE 4 BPM: 185 BPM
CARDIOPULMONARY STRESS PHASE 4 SPO2: 99 % SPO2
CARDIOPULMONARY STRESS PHASE 4 TIME SEC: 0 SEC
CARDIOPULMONARY STRESS PHASE 4 TIME: 12 MINS
CARDIOPULMONARY STRESS PHASE 5 BP MMHG: NORMAL MMHG
CARDIOPULMONARY STRESS PHASE 5 BPM: 210 BPM
CARDIOPULMONARY STRESS PHASE 5 SPO2: 98 % SPO2
CARDIOPULMONARY STRESS PHASE 5 TIME SEC: 0 SEC
CARDIOPULMONARY STRESS PHASE 5 TIME: 15 MINS
CARDIOPULMONARY SVC (L) ACTUAL: 3.92
CARDIOPULMONARY SVC (L) PERCENT: 85 %
CARDIOPULMONARY SVC (L) PREDICTED: 4.62
CARDIOPULMONARY TIDAL VOLUME REST: 406 ML
CARDIOPULMONARY TIDAL VOLUME VO2MAX: 2041 ML
CARDIOPULMONARY VE/VCO2 SLOPE: 29.26
CARDIOPULMONARY VENTILATORY EQUIVALENT 02 REST: 30
CARDIOPULMONARY VENTILATORY EQUIVALENT 02 V02: 33
CARDIOPULMONARY VENTILATORY EQUIVALENT C02 REST: 40
CARDIOPULMONARY VENTILATORY EQUIVALENT C02 SLOPE VO2MAX: 29.26
CARDIOPULMONARY VENTILATORY EQUIVALENT C02 VO2MAX: 29
CV STRESS MAX HR HE: 210
PREDICTED VO2MAX: 41.8
RATED PERCEIVED EXERTION: 17
STRESS ECHO BASELINE BP: NORMAL MMHG
STRESS ECHO BASELINE HR: 78 BPM
STRESS ECHO CALCULATED PERCENT HR: 107 %
STRESS ECHO LAST STRESS BP: NORMAL MMHG
STRESS ECHO POST ESTIMATED WORKLOAD: 11.7 METS
STRESS ECHO POST EXERCISE DUR MIN: 15 MIN
STRESS ECHO POST EXERCISE DUR SEC: 0 SEC
STRESS ECHO TARGET HR: 196

## 2023-04-16 ENCOUNTER — HEALTH MAINTENANCE LETTER (OUTPATIENT)
Age: 23
End: 2023-04-16

## 2023-06-05 ENCOUNTER — MYC MEDICAL ADVICE (OUTPATIENT)
Dept: CARDIOLOGY | Facility: CLINIC | Age: 23
End: 2023-06-05
Payer: COMMERCIAL

## 2023-06-25 ENCOUNTER — MYC MEDICAL ADVICE (OUTPATIENT)
Dept: CARDIOLOGY | Facility: CLINIC | Age: 23
End: 2023-06-25
Payer: COMMERCIAL

## 2024-04-22 NOTE — PATIENT INSTRUCTIONS
Thank you for visiting the Adult Congenital and Cardiovascular Genetics Clinic at the [facility].    Cardiology Providers you saw during your visit:  Travis Packer MD    Diagnosis:  subaortic valve stenosis    Results:  Travis Packer MD reviewed the results of your EKG and ECHO testing today in clinic.    ______________________________________________________________________________    Recommendations from your Cardiology Provider:    No changes today      General Cardiac Recommendations:  Continue to eat a heart healthy, low salt diet.  Continue to get 20-30 minutes of aerobic activity, 4-5 days per week.  Examples of aerobic activity include walking, running, swimming, cycling, etc.  Continue to observe good oral hygiene, with regular dental visits.    ____________________________________________________________________________________________________    SBE prophylaxis:   Yes____  No__x__    SBE prophylaxis applies to patients with certain heart conditions who are recommended to take antibiotics before dental appointments and other specific procedures. These antibiotics are to help prevent an infection of the heart (endocarditis) that certain patients are at higher risk of developing. The guidelines used come from the American Heart Association and are periodically updated.    ____________________________________________________________________________________________________    FASTING CHOLESTEROL was checked in the last 5 years YES__x__  NO____ (2023)  If no, please follow up with your primary care physician. You should have a cholesterol screening every 5 years at minimum, and every year if taking a medication for your cholesterol levels.     ____________________________________________________________________________________________________    Follow-up Plan:  Follow up with Dr Packer in 2 year with an echo and EKG  prior    ____________________________________________________________________________________________________    If you have questions or concerns, please call us at [phone] or contact us through [employer].    Doris Macias RN, BSN    Dana Daly (Scheduling)  Nurse Care Coordinator     Clinic   Adult Congenital and CV Genetics   Adult Congenital and CV Genetic  [facility]   [facility]  (P) [phone]     (P) [phone]  (F) [phone]     (F) [phone]      For after hours urgent needs, call [phone] and ask to speak to the Adult Congenital Physician on call.  Mention Job Code [id number].    For emergencies call 911.    [facility]  [facility]  Mail Code [id number]  [address] [place]  [address]

## 2024-04-23 ENCOUNTER — ANCILLARY PROCEDURE (OUTPATIENT)
Dept: CARDIOLOGY | Facility: CLINIC | Age: 24
End: 2024-04-23
Payer: COMMERCIAL

## 2024-04-23 ENCOUNTER — OFFICE VISIT (OUTPATIENT)
Dept: CARDIOLOGY | Facility: CLINIC | Age: 24
End: 2024-04-23
Payer: COMMERCIAL

## 2024-04-23 VITALS
SYSTOLIC BLOOD PRESSURE: 127 MMHG | HEART RATE: 76 BPM | DIASTOLIC BLOOD PRESSURE: 82 MMHG | WEIGHT: 148.7 LBS | OXYGEN SATURATION: 98 % | BODY MASS INDEX: 25.39 KG/M2

## 2024-04-23 DIAGNOSIS — Q24.9 ADULT CONGENITAL HEART DISEASE: Primary | ICD-10-CM

## 2024-04-23 DIAGNOSIS — Q24.4 SUBAORTIC VALVE STENOSIS, CONGENITAL: ICD-10-CM

## 2024-04-23 PROCEDURE — 93320 DOPPLER ECHO COMPLETE: CPT | Performed by: STUDENT IN AN ORGANIZED HEALTH CARE EDUCATION/TRAINING PROGRAM

## 2024-04-23 PROCEDURE — G0463 HOSPITAL OUTPT CLINIC VISIT: HCPCS

## 2024-04-23 PROCEDURE — 93010 ELECTROCARDIOGRAM REPORT: CPT | Performed by: INTERNAL MEDICINE

## 2024-04-23 PROCEDURE — 93303 ECHO TRANSTHORACIC: CPT | Performed by: STUDENT IN AN ORGANIZED HEALTH CARE EDUCATION/TRAINING PROGRAM

## 2024-04-23 PROCEDURE — 93005 ELECTROCARDIOGRAM TRACING: CPT

## 2024-04-23 PROCEDURE — 99214 OFFICE O/P EST MOD 30 MIN: CPT | Mod: 25

## 2024-04-23 PROCEDURE — 93325 DOPPLER ECHO COLOR FLOW MAPG: CPT | Performed by: STUDENT IN AN ORGANIZED HEALTH CARE EDUCATION/TRAINING PROGRAM

## 2024-04-23 ASSESSMENT — PAIN SCALES - GENERAL: PAINLEVEL: NO PAIN (0)

## 2024-04-23 NOTE — NURSING NOTE
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     Vitals were taken, medications reconciled, and EKG was performed.    Nancy Rome CNA  2:01 PM

## 2024-04-23 NOTE — PROGRESS NOTES
Adult Congenital Cardiology Visit    Patient:  Diogenes Streeter MRN:  [id number]   YOB: 2000 Age:  24 year old   Date of Visit:  Apr 23, 2024 PCP:  Beni Gagnon DO     Dear Dr. Gagnon,      I had the pleasure of seeing Diogenes Streeter at the [facility] Adult Congenital Cardiology Clinic on Apr 23, 2024.  Diogenes is a 25 yo male with history of subaortic membrane resection in 2006 at [facility]. He has had intermittent follow up at [facility] and [facility], most recently in September 2022. Diogenes established care in our [facility] clinic in 2023 and is here today for a follow up visit.     Overall, Diogenes reports that he is doing very well. He has had mo recent chest pain, much improved from last visit. Diogenes. He is active  At work with lifting and walking. He has a 2 1/1 yo daughter that keeps him moving as well. Denies palpitations, edema, orthopnea. No interval hospitalizations or surgery. HE sees the dentist regularly and does not use SBE prophylaxis.     Diogenes does not smoke or vape. He does have mild asthma. Has started on fluoxetine.   Past medical history:   Cardiac: Subaortic stenosis s/p resection [facility] 2006 at 5 years of age (Dr. Alvarez)  H/o Chronic chest pain > 6 months, atypical (per chart notes has had chronic chest pain for > 10 years)  Much better at 2024 visit.     Other PMH: Sleep apnea s/p Tonsillectomy at age 3 (Dr Townsend 2003)  Appendectomy 2015  Prepatellar nodule L knee, post traumatic  Mild asthma  Anxiety       Current Outpatient Medications   Medication Sig Dispense Refill    FLUoxetine (PROZAC) 20 MG capsule Take 40 mg by mouth daily         No Known Allergies      Family History:   Son age 8 years (2024) healthy  Daughter age (2/1/2)   Grandfather with heart problems by age 50  2 siblings healthy, mom healthy.   No FH of CHD, sudden death, myopathy, arrhythmia.         Social history:  Works, Lives with girlfriend  and children, no smoking, rare EtoH  Social History     Occupational History    Not on file   Tobacco Use    Smoking status: Never    Smokeless tobacco: Never   Substance and Sexual Activity    Alcohol use: Not on file    Drug use: Not on file    Sexual activity: Not on file       Marital Status: Single      Review of Systems: A comprehensive review of systems was performed and is negative, except as noted in the HPI and PMH  Review of Systems     Physical exam:  Vitals: /82 (BP Location: Left arm, Patient Position: Sitting, Cuff Size: Adult Regular)   Pulse 76   Wt 67.4 kg (148 lb 11.2 oz)   SpO2 98%   BMI 25.39 kg/m    BMI= Body mass index is 25.39 kg/m . Well appearing young man. There is no central or peripheral cyanosis. Pupils are reactive and sclera are not jaundiced. There is no conjunctival injection or discharge. EOMI. Mucous membranes are moist and pink.   Lungs are clear to ausculation bilaterally with no wheezes, rales or rhonchi. There is no increased work of breathing, retractions or nasal flaring. Precordium is quiet with a normally placed apical impulse. Well healed midline sternotomy.  On auscultation, heart sounds are regular with normal S1 and physiologically split S2. Soft SM LMSB, no DM, rubs or gallops.  Abdomen is soft and non-tender without masses or hepatomegaly. Femoral pulses are normal with no brachial femoral delay.Skin is without rashes, lesions, or significant bruising. Extremities are warm and well-perfused with no cyanosis, clubbing or edema. Peripheral pulses are normal and there is < 2 sec capillary refill. Patient is alert and oriented and moves all extremities equally with normal tone.       12 Lead EKG performed today shows NSR at 78 BPM with normal intervals and no chamber enlargement or hypertrophy.     Normal leadless Holter ECG 2023    An echocardiogram performed today shows normal ventricular function, Mild AI (Stable as described) and LVOT mean gradient of 8 mm  Hg.     FInal report:   Subaortic membrane status post resection ().     There is unobstructed flow through the left ventricular outflow tract, peak  gradient 17 mmHg, mean gradient 8 mmHg. Mild aortic valve insufficiency, no  aortic valve stenosis. The left and right ventricles have normal chamber size,  wall thickness, and systolic function.The calculated biplane left ventricular  ejection fraction is 64 %.       Results for orders placed or performed in visit on 24   EKG 12-lead, tracing only (Same Day)     Status: None (Preliminary result)   Result Value Ref Range    Systolic Blood Pressure  mmHg    Diastolic Blood Pressure  mmHg    Ventricular Rate 78 BPM    Atrial Rate 78 BPM    NY Interval 142 ms    QRS Duration 90 ms     ms    QTc 378 ms    P Axis 53 degrees    R AXIS -20 degrees    T Axis 56 degrees    Interpretation ECG       Sinus rhythm  Normal ECG  When compared with ECG of 10-MAIN-2023 16:02,  No significant change was found     Results for orders placed or performed in visit on 24   Echo Congenital Adult     Status: None    Narrative    [id number]  [id number]  [id number]  [id number]^LUKE^RHONDA^NISHANT                                                               Study ID: [id number]                                                 [facility] [place] [address]                                                Phone: [phone]                                Pediatric Echocardiogram  ______________________________________________________________________________  Name: PHIL MCINTOSH  Study Date: 2024 12:44 PM                  Patient Location: [facility]  MRN: [id number]                                  Age: 24 yrs  : 2000                                  BP: 147/97  mmHg  Gender: Male  Patient Class: Outpatient                        Height: 162 cm  Ordering Provider: RHONDA BUTLER             Weight: 72 kg  Referring Provider: RHONDA BUTLER            BSA: 1.8 m2  Performed By: Elizabeth Zuluaga  Report approved by: MD Trena Davey  Reason For Study: Subaortic valve stenosis, congenital  ______________________________________________________________________________  ##### CONCLUSIONS #####  Subaortic membrane status post resection (2005).     There is unobstructed flow through the left ventricular outflow tract, peak  gradient 17 mmHg, mean gradient 8 mmHg. Mild aortic valve insufficiency, no  aortic valve stenosis. The left and right ventricles have normal chamber size,  wall thickness, and systolic function.The calculated biplane left ventricular  ejection fraction is 64 %.  ______________________________________________________________________________  Technical information:  A complete two dimensional, MMODE, spectral and color Doppler transthoracic  echocardiogram is performed. The study quality is fair. Images are obtained  from parasternal, apical, subcostal and suprasternal notch views. Prior  echocardiogram available for comparison. ECG tracing shows regular rhythm.     Segmental Anatomy:  There is normal atrial arrangement, with concordant atrioventricular and  ventriculoarterial connections.     Systemic and pulmonary veins:  The inferior vena cava drains normally to the right atrium. Color flow  demonstrates flow from at least one pulmonary vein entering the left atrium.     Atria and atrial septum:  Normal right atrial size. The left atrium is normal in size. There is no  obvious atrial level shunting.     Atrioventricular valves:  The tricuspid valve is normal in appearance and motion. Trivial tricuspid  valve insufficiency. Insufficient jet to estimate right ventricular systolic  pressure. The mitral valve is normal in appearance and motion. There is  no  mitral valve insufficiency.     Ventricles and Ventricular Septum:  The left and right ventricles have normal chamber size, wall thickness, and  systolic function. The calculated biplane left ventricular ejection fraction  is 64 %. There is no ventricular level shunting.     Outflow tracts:  Normal great artery relationship. There is unobstructed flow through the right  ventricular outflow tract. The pulmonary valve motion is normal. There is  normal flow across the pulmonary valve. Trivial pulmonary valve insufficiency.  Post surgical resection of a subaortic membrane/ridge. There is unobstructed  flow through the left ventricular outflow tract. The mean systemic outflow  tract gradient is 8 mmHg. Tricuspid aortic valve with normal appearance and  motion. There is normal flow across the aortic valve. Mild (1+) aortic valve  insufficiency.     Great arteries:  The main pulmonary artery has normal appearance. There is unobstructed flow in  the main pulmonary artery. The pulmonary artery bifurcation is normal. There  is unobstructed flow in both branch pulmonary arteries. Normal ascending  aorta. The aortic arch appears normal. There is unobstructed antegrade flow in  the ascending, transverse arch, descending thoracic and abdominal aorta.     Arterial Shunts:  The ductal region is not imaged with this study.     Coronaries:  The coronary arteries are not evaluated.     Effusions, catheters, cannulas and leads:  No pericardial effusion.     MMode/2D Measurements & Calculations  LA dimension: 2.9 cm                   Ao root diam: 2.3 cm  LA/Ao: 1.3                             2 Chamber EF: 59.2 %  4 Chamber EF: 70.1 %                   EF Biplane: 64.3 %     Doppler Measurements & Calculations  MV E max cheryl: 83.1 cm/sec                Ao V2 max: 181.7 cm/sec  MV A max cheryl: 67.3 cm/sec                Ao max P.2 mmHg  MV E/A: 1.2  AI P1/2t: 283.2 msec                     LV V1 max: 196.4 cm/sec                                            LV V1 max PG: 15.4 mmHg  PA V2 max: 97.7 cm/sec  PA max PG: 3.8 mmHg     asc Ao max cheryl: 170.5 cm/sec          desc Ao max cheryl: 136.7 cm/sec  asc Ao max P.6 mmHg              desc Ao max P.5 mmHg     MPA max cheryl: 111.3 cm/sec  MPA max P.0 mmHg     [place] 2D Z-SCORE VALUES  Measurement Name Value Z-ScorePredictedNormal Range  Ao sinus diam(2D)2.3 cm-1.8   2.8      2.2 - 3.4  Ao ST Jx Diam(2D)2.0 cm-1.6   2.4      1.9 - 3.0  AoV shayla diam(2D)1.7 cm-2.1   2.1      1.7 - 2.5  asc Aorta(2D)    1.8 cm-2.4   2.5      1.9 - 3.1     [place] Z-Scores (Measurements & Calculations)  Measurement NameValue  Z-ScorePredictedNormal Range  IVSd(MM)        0.95 cm-0.11  0.97     0.68 - 1.26  LVIDd(MM)       4.3 cm -2.0   5.0      4.3 - 5.8  LVIDs(MM)       2.6 cm -1.9   3.3      2.6 - 4.0  FS(MM)          40.0 % 1.3    34.7     28.2 - 42.8     Report approved by: MD Trena Wise 2024 01:49 PM           X   Peak VO2 (ml/kg/min) VE/VCO2  [place]agan RER   40.90       29.26       1.19           Predicted VO2 (ml/kg/min) Predicted VO2 %   41.80       98           Resting Supine BP (mmHg) Resting Standing BP (mmHg) Final Stress BP (mmHg)   129/62       138/73       194/80         Resting Supine HR (bpm) Resting Standing HR (bpm)    78       86            Max HR (bpm) Max Predicted HR (bpm) Max Perdicted HR %   210       196       107           Exercise time (min) Exercise time (sec) Estimated workload (METS)   15       0       11.7           A cardiopulmonary stress test was performed following a Barry protocol with the patient exercising for 15 minutes and 0 seconds under the supervision of Dr. Arpan Roman.  Blood pressure and heart rate demonstrated a normal response to exercise.    The stress electrocardiogram is negative for inducible ischemic EKG changes.    There is no prior study for comparison.    The patient's exercise capacity is average.     The baseline spirometry revealed  normal lung volumes. Oxygen saturation remained above 97% throughout testing. The patient reported a sharp feeling in his chest when he took a deep inhale during exercise. The patient also reported 6/10 lightheadedness at peak exercise. Both symptoms resolved during exercise. There are no limitations to exercise.  In summary, Diogenes is a 24 year old with h/o subaortic stenosis s/p resection at age 5 years of age. He has stable mild AI and normal ventricular function with no significant  LVOT obstruction. He has a history of  chronic chest pain, atypical for cardiac pain and much more likely due to asthma or chest wall pain. This has improved since his last visit. He has also started mediccation for anxiety.     Recommendations:  Get regular exercise, no restrictions, rest if symptomatic   Regular dental visits, no antibiotic prophylaxis required   Recommend fetal echocardiogram with any future pregnancies.  Please call if any new concerns.   Follow up in 2 years with echocardiogram and labs.       Sincerely,    Travis Packer M.D.   of Pediatrics  Pediatric and Adult Congenital Cardiology  [facility]  Pediatric Cardiology Office [phone]  Adult Congenital Cardiology Triage and Scheduling [phone]    A total of 30 minutes were spent in personal review of testing, including echo and  ECG, review of documented history and interval events in chart,  face to face visit with discussion of findings and plan, and care coordination.       CC:Diogenes Sawyer

## 2024-04-23 NOTE — LETTER
4/23/2024      RE: Diogenes Streeter  [address]  [facility] [address]       Dear Colleague,    Thank you for the opportunity to participate in the care of your patient, Diogenes Streeter, at the [facility] [place] at [facility]. Please see a copy of my visit note below.    Adult Congenital Cardiology Visit    Patient:  Diogenes Streeter MRN:  [id number]   YOB: 2000 Age:  24 year old   Date of Visit:  Apr 23, 2024 PCP:  Beni Gagnon DO     Dear Dr. Gagnon,      I had the pleasure of seeing Diogenes Streeter at the [facility] on Apr 23, 2024.  Diogenes is a 25 yo male with history of subaortic membrane resection in 2006 at [facility]. He has had intermittent follow up at [facility], most recently in September 2022. Diogenes established care in our [facility] clinic in 2023 and is here today for a follow up visit.     Overall, Diogenes reports that he is doing very well. He has had mo recent chest pain, much improved from last visit. Diogenes. He is active  At work with lifting and walking. He has a 2 1/1 yo daughter that keeps him moving as well. Denies palpitations, edema, orthopnea. No interval hospitalizations or surgery. HE sees the dentist regularly and does not use SBE prophylaxis.     Diogenes does not smoke or vape. He does have mild asthma. Has started on fluoxetine.   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 Alvarez)  H/o Chronic chest pain > 6 months, atypical (per chart notes has had chronic chest pain for > 10 years)  Much better at 2024 visit.     Other PMH: Sleep apnea s/p Tonsillectomy at age 3 (Dr Townsend 2003)  Appendectomy 2015  Prepatellar nodule L knee, post traumatic  Mild asthma  Anxiety       Current Outpatient Medications   Medication  Sig Dispense Refill     FLUoxetine (PROZAC) 20 MG capsule Take 40 mg by mouth daily         No Known Allergies      Family History:   Son age 8 years (2024) healthy  Daughter age (2/1/2)   Grandfather with heart problems by age 50  2 siblings healthy, mom healthy.   No FH of CHD, sudden death, myopathy, arrhythmia.         Social history:  Works, Lives with girlfriend and children, no smoking, rare EtoH  Social History     Occupational History     Not on file   Tobacco Use     Smoking status: Never     Smokeless tobacco: Never   Substance and Sexual Activity     Alcohol use: Not on file     Drug use: Not on file     Sexual activity: Not on file       Marital Status: Single      Review of Systems: A comprehensive review of systems was performed and is negative, except as noted in the HPI and PMH  Review of Systems     Physical exam:  Vitals: /82 (BP Location: Left arm, Patient Position: Sitting, Cuff Size: Adult Regular)   Pulse 76   Wt 67.4 kg (148 lb 11.2 oz)   SpO2 98%   BMI 25.39 kg/m    BMI= Body mass index is 25.39 kg/m . Well appearing young man. There is no central or peripheral cyanosis. Pupils are reactive and sclera are not jaundiced. There is no conjunctival injection or discharge. EOMI. Mucous membranes are moist and pink.   Lungs are clear to ausculation bilaterally with no wheezes, rales or rhonchi. There is no increased work of breathing, retractions or nasal flaring. Precordium is quiet with a normally placed apical impulse. Well healed midline sternotomy.  On auscultation, heart sounds are regular with normal S1 and physiologically split S2. Soft SM LMSB, no DM, rubs or gallops.  Abdomen is soft and non-tender without masses or hepatomegaly. Femoral pulses are normal with no brachial femoral delay.Skin is without rashes, lesions, or significant bruising. Extremities are warm and well-perfused with no cyanosis, clubbing or edema. Peripheral pulses are normal and there is < 2 sec capillary  refill. Patient is alert and oriented and moves all extremities equally with normal tone.       12 Lead EKG performed today shows NSR at 78 BPM with normal intervals and no chamber enlargement or hypertrophy.     Normal leadless Holter ECG 2023    An echocardiogram performed today shows normal ventricular function, Mild AI (Stable as described) and LVOT mean gradient of 8 mm Hg.     FInal report:   Subaortic membrane status post resection (2005).     There is unobstructed flow through the left ventricular outflow tract, peak  gradient 17 mmHg, mean gradient 8 mmHg. Mild aortic valve insufficiency, no  aortic valve stenosis. The left and right ventricles have normal chamber size,  wall thickness, and systolic function.The calculated biplane left ventricular  ejection fraction is 64 %.       Results for orders placed or performed in visit on 04/23/24   EKG 12-lead, tracing only (Same Day)     Status: None (Preliminary result)   Result Value Ref Range    Systolic Blood Pressure  mmHg    Diastolic Blood Pressure  mmHg    Ventricular Rate 78 BPM    Atrial Rate 78 BPM    MI Interval 142 ms    QRS Duration 90 ms     ms    QTc 378 ms    P Axis 53 degrees    R AXIS -20 degrees    T Axis 56 degrees    Interpretation ECG       Sinus rhythm  Normal ECG  When compared with ECG of 10-MAIN-2023 16:02,  No significant change was found     Results for orders placed or performed in visit on 04/23/24   Echo Congenital Adult     Status: None    Narrative    [id number]  [id number]  [id number]  [id number]^LUKE^RHONDA^NISHANT                                                               Study ID: [id number]                                                 [facility]'[address] [address]                                                Phone: [phone]  [phone]                                Pediatric Echocardiogram  ______________________________________________________________________________  Name: PHIL MCINTOSH  Study Date: 2024 12:44 PM                  Patient Location: [facility]  MRN: [id number]                                  Age: 24 yrs  : 2000                                  BP: 147/97 mmHg  Gender: Male  Patient Class: Outpatient                        Height: 162 cm  Ordering Provider: RHONDA BUTLER             Weight: 72 kg  Referring Provider: RHONDA BUTLER            BSA: 1.8 m2  Performed By: Elizabeth Zuluaga  Report approved by: MD Trena Davey  Reason For Study: Subaortic valve stenosis, congenital  ______________________________________________________________________________  ##### CONCLUSIONS #####  Subaortic membrane status post resection ().     There is unobstructed flow through the left ventricular outflow tract, peak  gradient 17 mmHg, mean gradient 8 mmHg. Mild aortic valve insufficiency, no  aortic valve stenosis. The left and right ventricles have normal chamber size,  wall thickness, and systolic function.The calculated biplane left ventricular  ejection fraction is 64 %.  ______________________________________________________________________________  Technical information:  A complete two dimensional, MMODE, spectral and color Doppler transthoracic  echocardiogram is performed. The study quality is fair. Images are obtained  from parasternal, apical, subcostal and suprasternal notch views. Prior  echocardiogram available for comparison. ECG tracing shows regular rhythm.     Segmental Anatomy:  There is normal atrial arrangement, with concordant atrioventricular and  ventriculoarterial connections.     Systemic and pulmonary veins:  The inferior vena cava drains normally to the right atrium. Color flow  demonstrates flow from at least one pulmonary vein entering the left atrium.     Atria and atrial  septum:  Normal right atrial size. The left atrium is normal in size. There is no  obvious atrial level shunting.     Atrioventricular valves:  The tricuspid valve is normal in appearance and motion. Trivial tricuspid  valve insufficiency. Insufficient jet to estimate right ventricular systolic  pressure. The mitral valve is normal in appearance and motion. There is no  mitral valve insufficiency.     Ventricles and Ventricular Septum:  The left and right ventricles have normal chamber size, wall thickness, and  systolic function. The calculated biplane left ventricular ejection fraction  is 64 %. There is no ventricular level shunting.     Outflow tracts:  Normal great artery relationship. There is unobstructed flow through the right  ventricular outflow tract. The pulmonary valve motion is normal. There is  normal flow across the pulmonary valve. Trivial pulmonary valve insufficiency.  Post surgical resection of a subaortic membrane/ridge. There is unobstructed  flow through the left ventricular outflow tract. The mean systemic outflow  tract gradient is 8 mmHg. Tricuspid aortic valve with normal appearance and  motion. There is normal flow across the aortic valve. Mild (1+) aortic valve  insufficiency.     Great arteries:  The main pulmonary artery has normal appearance. There is unobstructed flow in  the main pulmonary artery. The pulmonary artery bifurcation is normal. There  is unobstructed flow in both branch pulmonary arteries. Normal ascending  aorta. The aortic arch appears normal. There is unobstructed antegrade flow in  the ascending, transverse arch, descending thoracic and abdominal aorta.     Arterial Shunts:  The ductal region is not imaged with this study.     Coronaries:  The coronary arteries are not evaluated.     Effusions, catheters, cannulas and leads:  No pericardial effusion.     MMode/2D Measurements & Calculations  LA dimension: 2.9 cm                   Ao root diam: 2.3 cm  LA/Ao: 1.3                              2 Chamber EF: 59.2 %  4 Chamber EF: 70.1 %                   EF Biplane: 64.3 %     Doppler Measurements & Calculations  MV E max cheryl: 83.1 cm/sec                Ao V2 max: 181.7 cm/sec  MV A max cheryl: 67.3 cm/sec                Ao max P.2 mmHg  MV E/A: 1.2  AI P1/2t: 283.2 msec                     LV V1 max: 196.4 cm/sec                                           LV V1 max PG: 15.4 mmHg  PA V2 max: 97.7 cm/sec  PA max PG: 3.8 mmHg     asc Ao max cheryl: 170.5 cm/sec          desc Ao max cheryl: 136.7 cm/sec  asc Ao max P.6 mmHg              desc Ao max P.5 mmHg     MPA max cheryl: 111.3 cm/sec  MPA max P.0 mmHg     [place] 2D Z-SCORE VALUES  Measurement Name Value Z-ScorePredictedNormal Range  Ao sinus diam(2D)2.3 cm-1.8   2.8      2.2 - 3.4  Ao ST Jx Diam(2D)2.0 cm-1.6   2.4      1.9 - 3.0  AoV shayla diam(2D)1.7 cm-2.1   2.1      1.7 - 2.5  asc Aorta(2D)    1.8 cm-2.4   2.5      1.9 - 3.1     [place] Z-Scores (Measurements & Calculations)  Measurement NameValue  Z-ScorePredictedNormal Range  IVSd(MM)        0.95 cm-0.11  0.97     0.68 - 1.26  LVIDd(MM)       4.3 cm -2.0   5.0      4.3 - 5.8  LVIDs(MM)       2.6 cm -1.9   3.3      2.6 - 4.0  FS(MM)          40.0 % 1.3    34.7     28.2 - 42.8     Report approved by: MD Trena Wise 2024 01:49 PM           X   Peak VO2 (ml/kg/min) VE/VCO2  Love RER   40.90       29.26       1.19           Predicted VO2 (ml/kg/min) Predicted VO2 %   41.80       98           Resting Supine BP (mmHg) Resting Standing BP (mmHg) Final Stress BP (mmHg)   129/62       138/73       194/80         Resting Supine HR (bpm) Resting Standing HR (bpm)    78       86            Max HR (bpm) Max Predicted HR (bpm) Max Perdicted HR %   210       196       107           Exercise time (min) Exercise time (sec) Estimated workload (METS)   15       0       11.7           A cardiopulmonary stress test was performed following a Barry protocol with the patient  exercising for 15 minutes and 0 seconds under the supervision of Dr. Arpan Roman.  Blood pressure and heart rate demonstrated a normal response to exercise.     The stress electrocardiogram is negative for inducible ischemic EKG changes.     There is no prior study for comparison.     The patient's exercise capacity is average.     The baseline spirometry revealed normal lung volumes. Oxygen saturation remained above 97% throughout testing. The patient reported a sharp feeling in his chest when he took a deep inhale during exercise. The patient also reported 6/10 lightheadedness at peak exercise. Both symptoms resolved during exercise. There are no limitations to exercise.  In summary, Diogenes is a 24 year old with h/o subaortic stenosis s/p resection at age 5 years of age. He has stable mild AI and normal ventricular function with no significant  LVOT obstruction. He has a history of  chronic chest pain, atypical for cardiac pain and much more likely due to asthma or chest wall pain. This has improved since his last visit. He has also started mediccation for anxiety.     Recommendations:  Get regular exercise, no restrictions, rest if symptomatic   Regular dental visits, no antibiotic prophylaxis required   Recommend fetal echocardiogram with any future pregnancies.  Please call if any new concerns.   Follow up in 2 years with echocardiogram and labs.       Sincerely,    Travis Packer M.D.   of Pediatrics  Pediatric and Adult Congenital Cardiology  [facility]  Pediatric Cardiology Office [phone]  Adult Congenital Cardiology Triage and Scheduling [phone]    A total of 30 minutes were spent in personal review of testing, including echo and  ECG, review of documented history and interval events in chart,  face to face visit with discussion of findings and plan, and care coordination.       CC:Diogenes  Digna

## 2024-04-24 LAB
ATRIAL RATE - MUSE: 78 BPM
DIASTOLIC BLOOD PRESSURE - MUSE: NORMAL MMHG
INTERPRETATION ECG - MUSE: NORMAL
P AXIS - MUSE: 53 DEGREES
PR INTERVAL - MUSE: 142 MS
QRS DURATION - MUSE: 90 MS
QT - MUSE: 332 MS
QTC - MUSE: 378 MS
R AXIS - MUSE: -20 DEGREES
SYSTOLIC BLOOD PRESSURE - MUSE: NORMAL MMHG
T AXIS - MUSE: 56 DEGREES
VENTRICULAR RATE- MUSE: 78 BPM

## 2024-06-22 ENCOUNTER — HEALTH MAINTENANCE LETTER (OUTPATIENT)
Age: 24
End: 2024-06-22

## 2025-07-12 ENCOUNTER — HEALTH MAINTENANCE LETTER (OUTPATIENT)
Age: 25
End: 2025-07-12